# Patient Record
Sex: FEMALE | Race: WHITE | Employment: PART TIME | URBAN - METROPOLITAN AREA
[De-identification: names, ages, dates, MRNs, and addresses within clinical notes are randomized per-mention and may not be internally consistent; named-entity substitution may affect disease eponyms.]

---

## 2017-06-07 ENCOUNTER — HOSPITAL ENCOUNTER (OUTPATIENT)
Dept: MAMMOGRAPHY | Age: 62
Discharge: HOME | End: 2017-06-07
Attending: OBSTETRICS & GYNECOLOGY
Payer: COMMERCIAL

## 2017-06-07 DIAGNOSIS — Z12.39 SCREENING BREAST EXAMINATION: ICD-10-CM

## 2017-06-07 PROCEDURE — 77067 SCR MAMMO BI INCL CAD: CPT

## 2018-09-11 ENCOUNTER — HOSPITAL ENCOUNTER (OUTPATIENT)
Dept: MAMMOGRAPHY | Age: 63
Discharge: HOME | End: 2018-09-11
Attending: OBSTETRICS & GYNECOLOGY
Payer: COMMERCIAL

## 2018-09-11 DIAGNOSIS — Z12.31 VISIT FOR SCREENING MAMMOGRAM: ICD-10-CM

## 2018-09-11 PROCEDURE — 77067 SCR MAMMO BI INCL CAD: CPT

## 2018-11-29 ENCOUNTER — HOSPITAL ENCOUNTER (OUTPATIENT)
Dept: CT IMAGING | Age: 63
Discharge: HOME | End: 2018-11-29
Attending: FAMILY MEDICINE
Payer: SELF-PAY

## 2018-11-29 DIAGNOSIS — E78.2 HYPERLIPIDEMIA, MIXED: ICD-10-CM

## 2018-11-29 PROCEDURE — 75571 CT HRT W/O DYE W/CA TEST: CPT

## 2019-12-12 ENCOUNTER — HOSPITAL ENCOUNTER (OUTPATIENT)
Dept: MAMMOGRAPHY | Age: 64
Discharge: HOME | End: 2019-12-12
Attending: OBSTETRICS & GYNECOLOGY
Payer: COMMERCIAL

## 2019-12-12 DIAGNOSIS — Z12.31 VISIT FOR SCREENING MAMMOGRAM: ICD-10-CM

## 2019-12-12 PROCEDURE — 77063 BREAST TOMOSYNTHESIS BI: CPT

## 2020-11-19 ENCOUNTER — TRANSCRIBE ORDER (OUTPATIENT)
Dept: SCHEDULING | Age: 65
End: 2020-11-19

## 2020-11-19 DIAGNOSIS — Z12.31 SCREENING MAMMOGRAM, ENCOUNTER FOR: Primary | ICD-10-CM

## 2021-07-16 ENCOUNTER — TRANSCRIBE ORDER (OUTPATIENT)
Dept: SCHEDULING | Age: 66
End: 2021-07-16

## 2021-07-16 DIAGNOSIS — Z12.31 SCREENING MAMMOGRAM FOR HIGH-RISK PATIENT: Primary | ICD-10-CM

## 2021-07-21 ENCOUNTER — HOSPITAL ENCOUNTER (OUTPATIENT)
Dept: MAMMOGRAPHY | Age: 66
Discharge: HOME | End: 2021-07-21
Attending: OBSTETRICS & GYNECOLOGY
Payer: COMMERCIAL

## 2021-07-21 DIAGNOSIS — Z12.31 SCREENING MAMMOGRAM FOR HIGH-RISK PATIENT: ICD-10-CM

## 2021-07-21 PROCEDURE — 77067 SCR MAMMO BI INCL CAD: CPT

## 2022-10-11 ENCOUNTER — HOSPITAL ENCOUNTER (OUTPATIENT)
Dept: MAMMOGRAPHY | Age: 67
Discharge: HOME | End: 2022-10-14
Payer: COMMERCIAL

## 2022-10-11 DIAGNOSIS — Z12.31 ENCOUNTER FOR SCREENING MAMMOGRAM FOR MALIGNANT NEOPLASM OF BREAST: ICD-10-CM

## 2022-10-11 PROCEDURE — 77063 BREAST TOMOSYNTHESIS BI: CPT

## 2023-05-16 ENCOUNTER — HOSPITAL ENCOUNTER (OUTPATIENT)
Dept: PHYSICAL THERAPY | Age: 68
Setting detail: RECURRING SERIES
Discharge: HOME | End: 2023-05-19
Payer: COMMERCIAL

## 2023-05-16 PROCEDURE — 97110 THERAPEUTIC EXERCISES: CPT

## 2023-05-16 PROCEDURE — 97162 PT EVAL MOD COMPLEX 30 MIN: CPT

## 2023-05-16 NOTE — PROGRESS NOTES
Jesus Sandra  : 1955  Primary: Nelida Parks ([facility])  Secondary:  [address] @ [address] [place] [address]  Phone: [phone]  Fax: [phone] Plan Frequency: 2/week    Plan of Care/Certification Expiration Date: 07/15/23      >PT Visit Info:  Plan Frequency: 2/week  Plan of Care/Certification Expiration Date: 07/15/23      Visit Count:  1    OUTPATIENT PHYSICAL THERAPY:OP NOTE TYPE: Treatment Note 2023       Episode  }Appt Desk             Treatment Diagnosis:  Pain in Left Knee (M25.562)  Stiffness of Left Knee, Not elsewhere classified (M25.662)  Generalized Muscle Weakness (M62.81)  Medical/Referring Diagnosis:  Chronic pain of left knee [E37.256, G89.29]  Referring Physician:  Ferdinand Dominguez DO MD Orders:  PT Eval and Treat   Date of Onset:  4/15/23   Allergies:   Patient has no allergy information on record. Restrictions/Precautions: none    Interventions Planned (Treatment may consist of any combination of the following):    Current Treatment Recommendations: Strengthening; ROM; Balance training; Stair training; Neuromuscular re-education; Manual; Home exercise program; Modalities; Therapeutic activities     >Subjective Comments: Pt reports no pain at rest currently, but pain upon rising and clicking on descending stairs. >Initial:      0/10>Post Session:        0/10  Medications Last Reviewed:  2023  Updated Objective Findings:  see evaluation from 23    Goals: (Goals have been discussed and agreed upon with patient.)  Short-Term Functional Goals: Time Frame: 4 weeks  Pt will confirm compliance with home program to improve strength and ROM of L hip and knee. Pt will have L knee extension to +2* to be symmetrical to R knee and improve stability in stance phase of gait.    Discharge Goals: Time Frame: 8 weeks  Pt will be able to ambulate with limitation or significant increase in pain to be mobile in community and return to

## 2023-05-16 NOTE — THERAPY EVALUATION
Dean Tejeda  : 1955  Primary: [facility] ([facility]  Secondary:  [address] @ [address] [place] [address]  Phone: [phone]  Fax: [phone] Plan Frequency: 2/week  Plan of Care/Certification Expiration Date: 07/15/23      PT Visit Info:  Plan Frequency: 2/week  Plan of Care/Certification Expiration Date: 07/15/23      Visit Count:  1                OUTPATIENT PHYSICAL THERAPY:             OP NOTE TYPE: Initial Assessment 2023               Episode (L knee pain and weakness) Appt Desk         Treatment Diagnosis:  Pain in Left Knee (M25.562)  Stiffness of Left Knee, Not elsewhere classified (M25.662)  Generalized Muscle Weakness (M62.81)  Medical/Referring Diagnosis:  Chronic pain of left knee [Q94.144, G89.29]  Referring Physician:  India Forrest DO MD Orders:  PT Eval and Treat   Return MD Appt:  TBD  Date of Onset:     4/15/23  Allergies:  Patient has no allergy information on record. Restrictions/Precautions:  none         Medications Last Reviewed:  2023     SUBJECTIVE   History of Injury/Illness (Reason for Referral):  Pt reports that she recently began experiencing L knee pain on medial aspect as well as clicking sound on anterolateral aspect with descending stairs. She had previous meniscus tear in R knee and so she is cautious and doesn't want to injure herself. She has stiffness with prolonged standing and sitting and can't walk as far as she'd like now. Patient Stated Goal(s):  \"hike\"  Initial:      0/10 Post Session:      010  Past Medical History/Comorbidities:   Ms. Rasheed Franco  has a past medical history of Asthma and Hyperlipidemia. Ms. Rasheed Franco  has a past surgical history that includes orthopedic surgery (Right, ); Colonoscopy (2006, 2017); heent; and gyn.   Social History/Living Environment:   Lives With: Spouse  Type of Home: House  Home Layout: Two level     Prior Level of Function/Work/Activity: no

## 2023-05-25 ENCOUNTER — HOSPITAL ENCOUNTER (OUTPATIENT)
Dept: PHYSICAL THERAPY | Age: 68
Setting detail: RECURRING SERIES
Discharge: HOME | End: 2023-05-28
Payer: COMMERCIAL

## 2023-05-25 PROCEDURE — 97110 THERAPEUTIC EXERCISES: CPT

## 2023-05-30 ENCOUNTER — HOSPITAL ENCOUNTER (OUTPATIENT)
Dept: MAMMOGRAPHY | Age: 68
Discharge: HOME | End: 2023-06-02
Payer: COMMERCIAL

## 2023-05-30 DIAGNOSIS — M85.852 OSTEOPENIA OF NECK OF LEFT FEMUR: ICD-10-CM

## 2023-05-30 PROCEDURE — 77080 DXA BONE DENSITY AXIAL: CPT

## 2023-06-01 ENCOUNTER — HOSPITAL ENCOUNTER (OUTPATIENT)
Dept: PHYSICAL THERAPY | Age: 68
Setting detail: RECURRING SERIES
Discharge: HOME | End: 2023-06-04
Payer: COMMERCIAL

## 2023-06-01 PROCEDURE — 97110 THERAPEUTIC EXERCISES: CPT

## 2023-06-06 ENCOUNTER — HOSPITAL ENCOUNTER (OUTPATIENT)
Dept: PHYSICAL THERAPY | Age: 68
Setting detail: RECURRING SERIES
Discharge: HOME | End: 2023-06-09
Payer: COMMERCIAL

## 2023-06-06 PROCEDURE — 97110 THERAPEUTIC EXERCISES: CPT

## 2023-06-06 NOTE — PROGRESS NOTES
Amy Bella  : 1955  Primary: Roseau Ten Parks [facility]  Secondary:  [address] @ [address] [place] [address]  Phone: [phone]  Fax: [phone] Plan Frequency: 2/week    Plan of Care/Certification Expiration Date: 07/15/23      >PT Visit Info:  Plan Frequency: 2/week  Plan of Care/Certification Expiration Date: 07/15/23      Visit Count:  4    OUTPATIENT PHYSICAL THERAPY:OP NOTE TYPE: Treatment Note 2023       Episode  }Appt Desk             Treatment Diagnosis:  Pain in Left Knee (M25.562)  Stiffness of Left Knee, Not elsewhere classified (M25.662)  Generalized Muscle Weakness (M62.81)  Medical/Referring Diagnosis:  Chronic pain of left knee [I17.033, G89.29]  Referring Physician:  Tanvi Guardado DO MD Orders:  PT Eval and Treat   Date of Onset:  4/15/23   Allergies:   Patient has no allergy information on record. Restrictions/Precautions: none    Interventions Planned (Treatment may consist of any combination of the following):    Current Treatment Recommendations: Strengthening; ROM; Balance training; Stair training; Neuromuscular re-education; Manual; Home exercise program; Modalities; Therapeutic activities     >Subjective Comments: Pt reports she notices significant improvements. She feels like she can walk and navigate stairs without pain now. She still has very occasional pain in the knee with twisting and can tell its not as strong as the right knee. >Initial:      0/10>Post Session:        0/10  Medications Last Reviewed:  2023  Updated Objective Findings:  see evaluation from 23    Goals: (Goals have been discussed and agreed upon with patient.)  Short-Term Functional Goals: Time Frame: 4 weeks  Pt will confirm compliance with home program to improve strength and ROM of L hip and knee. Pt will have L knee extension to +2* to be symmetrical to R knee and improve stability in stance phase of gait.    Discharge Goals: Time

## 2023-06-15 ENCOUNTER — HOSPITAL ENCOUNTER (OUTPATIENT)
Dept: PHYSICAL THERAPY | Age: 68
Setting detail: RECURRING SERIES
Discharge: HOME | End: 2023-06-18
Payer: COMMERCIAL

## 2023-06-15 PROCEDURE — 97110 THERAPEUTIC EXERCISES: CPT

## 2023-06-20 ENCOUNTER — APPOINTMENT (OUTPATIENT)
Dept: PHYSICAL THERAPY | Age: 68
End: 2023-06-20
Payer: COMMERCIAL

## 2023-06-22 ENCOUNTER — APPOINTMENT (OUTPATIENT)
Dept: PHYSICAL THERAPY | Age: 68
End: 2023-06-22
Payer: COMMERCIAL

## 2023-06-27 ENCOUNTER — APPOINTMENT (OUTPATIENT)
Dept: PHYSICAL THERAPY | Age: 68
End: 2023-06-27
Payer: COMMERCIAL

## 2023-06-29 ENCOUNTER — APPOINTMENT (OUTPATIENT)
Dept: PHYSICAL THERAPY | Age: 68
End: 2023-06-29
Payer: COMMERCIAL

## 2023-09-21 ENCOUNTER — TRANSCRIBE ORDERS (OUTPATIENT)
Dept: SCHEDULING | Age: 68
End: 2023-09-21

## 2023-09-21 DIAGNOSIS — Z12.31 SCREENING MAMMOGRAM FOR HIGH-RISK PATIENT: Primary | ICD-10-CM

## 2023-10-12 ENCOUNTER — HOSPITAL ENCOUNTER (OUTPATIENT)
Dept: MAMMOGRAPHY | Age: 68
Discharge: HOME | End: 2023-10-12
Payer: COMMERCIAL

## 2023-10-12 VITALS — BODY MASS INDEX: 27.23 KG/M2 | WEIGHT: 148 LBS | HEIGHT: 62 IN

## 2023-10-12 DIAGNOSIS — Z12.31 SCREENING MAMMOGRAM FOR HIGH-RISK PATIENT: ICD-10-CM

## 2023-10-12 PROCEDURE — 77063 BREAST TOMOSYNTHESIS BI: CPT

## 2024-09-18 ENCOUNTER — TRANSCRIBE ORDERS (OUTPATIENT)
Facility: HOSPITAL | Age: 69
End: 2024-09-18

## 2024-09-18 DIAGNOSIS — Z12.31 VISIT FOR SCREENING MAMMOGRAM: Primary | ICD-10-CM

## 2024-11-01 ENCOUNTER — HOSPITAL ENCOUNTER (OUTPATIENT)
Dept: MAMMOGRAPHY | Age: 69
Discharge: HOME | End: 2024-11-04
Payer: COMMERCIAL

## 2024-11-01 DIAGNOSIS — Z12.31 VISIT FOR SCREENING MAMMOGRAM: ICD-10-CM

## 2024-11-01 PROCEDURE — 77063 BREAST TOMOSYNTHESIS BI: CPT

## 2024-11-15 ENCOUNTER — HOSPITAL ENCOUNTER (OUTPATIENT)
Dept: MAMMOGRAPHY | Age: 69
Discharge: HOME | End: 2024-11-18
Payer: COMMERCIAL

## 2024-11-15 DIAGNOSIS — R92.8 ABNORMAL SCREENING MAMMOGRAM: ICD-10-CM

## 2024-11-15 PROCEDURE — 76642 ULTRASOUND BREAST LIMITED: CPT
